# Patient Record
Sex: MALE | Race: BLACK OR AFRICAN AMERICAN | Employment: FULL TIME | URBAN - METROPOLITAN AREA
[De-identification: names, ages, dates, MRNs, and addresses within clinical notes are randomized per-mention and may not be internally consistent; named-entity substitution may affect disease eponyms.]

---

## 2021-12-18 ENCOUNTER — APPOINTMENT (OUTPATIENT)
Dept: CT IMAGING | Age: 56
End: 2021-12-18
Payer: COMMERCIAL

## 2021-12-18 ENCOUNTER — HOSPITAL ENCOUNTER (EMERGENCY)
Age: 56
Discharge: HOME | End: 2021-12-18
Payer: COMMERCIAL

## 2021-12-18 VITALS
DIASTOLIC BLOOD PRESSURE: 91 MMHG | OXYGEN SATURATION: 98 % | HEIGHT: 69 IN | WEIGHT: 165 LBS | TEMPERATURE: 97.9 F | BODY MASS INDEX: 24.44 KG/M2 | RESPIRATION RATE: 16 BRPM | SYSTOLIC BLOOD PRESSURE: 143 MMHG | HEART RATE: 77 BPM

## 2021-12-18 DIAGNOSIS — R20.2 PARESTHESIA: ICD-10-CM

## 2021-12-18 DIAGNOSIS — G62.9 NEUROPATHY: Primary | ICD-10-CM

## 2021-12-18 DIAGNOSIS — M50.90 CERVICAL DISC DISEASE: ICD-10-CM

## 2021-12-18 PROCEDURE — 99282 EMERGENCY DEPT VISIT SF MDM: CPT

## 2021-12-18 PROCEDURE — 70450 CT HEAD/BRAIN W/O DYE: CPT

## 2021-12-18 PROCEDURE — 72125 CT NECK SPINE W/O DYE: CPT

## 2021-12-18 RX ORDER — METHYLPREDNISOLONE 4 MG/1
2 TABLET ORAL DAILY
Qty: 3 TABLET | Refills: 0 | Status: SHIPPED | OUTPATIENT
Start: 2021-12-18 | End: 2021-12-24

## 2021-12-18 RX ORDER — AMLODIPINE BESYLATE 5 MG/1
5 TABLET ORAL DAILY
COMMUNITY

## 2021-12-18 ASSESSMENT — ENCOUNTER SYMPTOMS
EYE REDNESS: 0
PHOTOPHOBIA: 0
COUGH: 0
DIARRHEA: 0
EYE DISCHARGE: 0
STRIDOR: 0
CONSTIPATION: 0
EYE ITCHING: 0
SHORTNESS OF BREATH: 0
WHEEZING: 0
NAUSEA: 0
BACK PAIN: 0
COLOR CHANGE: 0
ABDOMINAL PAIN: 0
EYE PAIN: 0
VOMITING: 0

## 2021-12-18 ASSESSMENT — PAIN SCALES - GENERAL: PAINLEVEL_OUTOF10: 6

## 2021-12-18 NOTE — ED PROVIDER NOTES
[address] [facility]        Pt Name: Antwon Mills  MRN: [id number]  [place] 1965  Date of evaluation: 12/18/2021  Provider: Jorge Ragland PA-C  PCP: No primary care provider on file. Note Started: 10:29 AM EST       MERT. I have evaluated this patient. My supervising physician was available for consultation. CHIEF COMPLAINT       Chief Complaint   Patient presents with    Arm Pain     c/o left arm numbness/pain/tingling \"for a few months\", c/o burning in left hand. Denies injury    Numbness       HISTORY OF PRESENT ILLNESS   (Location, Timing/Onset, Context/Setting, Quality, Duration, Modifying Factors, Severity, Associated Signs and Symptoms)  Note limiting factors. Chief Complaint: Left arm tingling and pain    Antwon Mills is a 55 y.o. male who presents to the emergency department complaining of left arm tingling and pain for over 1 year. For the past few months, the pain and tingling has been more constant and now reports a burning sensation in 4 of his fingers of the left hand. Denies any acute weakness, neck pain, headache, dizziness, headedness, chest pain, shortness of breath or abdominal pain. This is the first time he has addressed this with a medical provider. He also reports that for the past month, he has similar presentation in the right arm but not as severe as the left arm. He assembles and staples boxes at work. He is right-hand dominant but is constantly flipping boxes over with his left hand and arm. Nursing Notes were all reviewed and agreed with or any disagreements were addressed in the HPI. REVIEW OF SYSTEMS    (2-9 systems for level 4, 10 or more for level 5)     Review of Systems   Constitutional: Negative for chills and fever. HENT: Negative. Eyes: Negative for photophobia, pain, discharge, redness, itching and visual disturbance.    Respiratory: Negative for cough, shortness of breath, wheezing and stridor. Cardiovascular: Negative for chest pain, palpitations and leg swelling. Gastrointestinal: Negative for abdominal pain, constipation, diarrhea, nausea and vomiting. Genitourinary: Negative. Musculoskeletal: Positive for myalgias. Negative for arthralgias, back pain, gait problem, joint swelling, neck pain and neck stiffness. Skin: Negative for color change, pallor, rash and wound. Neurological: Positive for numbness (tingling). Negative for dizziness, tremors, seizures, syncope, facial asymmetry, speech difficulty, weakness, light-headedness and headaches. Psychiatric/Behavioral: Negative for confusion. All other systems reviewed and are negative. Positives and Pertinent negatives as per HPI. Except as noted above in the ROS, all other systems were reviewed and negative. PAST MEDICAL HISTORY     Past Medical History:   Diagnosis Date    Hypertension          SURGICAL HISTORY   History reviewed. No pertinent surgical history. [id number]       Discharge Medication List as of 12/18/2021 11:31 AM      CONTINUE these medications which have NOT CHANGED    Details   amLODIPine (NORVASC) 5 MG tablet Take 5 mg by mouth dailyHistorical Med               ALLERGIES     Patient has no known allergies. FAMILYHISTORY     History reviewed. No pertinent family history. SOCIAL HISTORY       Social History     Tobacco Use    Smoking status: Current Every Day Smoker     Packs/day: 0.50     Types: Cigarettes    Smokeless tobacco: Never Used   Substance Use Topics    Alcohol use: Not Currently    Drug use: Never       SCREENINGS   NIH Stroke Scale  NIH Stroke Scale Assessed: Yes  Interval: Baseline  Level of Consciousness (1a. ): Alert  LOC Questions (1b. ):  Answers both correctly  LOC Commands (1c. ): Performs both tasks correctly  Best Gaze (2. ): Normal  Visual (3. ): No visual loss  Facial Palsy (4. ): Normal symmetrical movement  Motor Arm, Left (5a. ): No drift  Motor Arm, Right (5b. ): No drift  Motor Leg, Left (6a. ): No drift  Motor Leg, Right (6b. ): No drift  Limb Ataxia (7. ): Absent  Sensory (8. ): Normal  Best Language (9. ): No aphasia  Dysarthria (10. ): Normal  Extinction and Inattention (11): No abnormality  Total: 0         PHYSICAL EXAM    (up to 7 for level 4, 8 or more for level 5)     ED Triage Vitals [12/18/21 0946]   BP Temp Temp Source Pulse Resp SpO2 Height Weight   (!) 143/91 97.9 °F (36.6 °C) Oral 77 16 98 % 5' 9\" (1.753 m) 165 lb (74.8 kg)       Physical Exam  Vitals and nursing note reviewed. Constitutional:       Appearance: Normal appearance. He is well-developed. He is not toxic-appearing or diaphoretic. HENT:      Head: Normocephalic and atraumatic. Right Ear: External ear normal.      Left Ear: External ear normal.      Nose: Nose normal.      Mouth/Throat:      Mouth: Mucous membranes are moist.      Pharynx: Oropharynx is clear. Eyes:      General: No scleral icterus. Right eye: No discharge. Left eye: No discharge. Extraocular Movements: Extraocular movements intact. Conjunctiva/sclera: Conjunctivae normal.      Pupils: Pupils are equal, round, and reactive to light. Cardiovascular:      Rate and Rhythm: Normal rate. Pulses:           Carotid pulses are 2+ on the right side and 2+ on the left side. Radial pulses are 2+ on the right side and 2+ on the left side. Pulmonary:      Effort: Pulmonary effort is normal.      Breath sounds: Normal breath sounds. Abdominal:      General: Bowel sounds are normal.      Palpations: Abdomen is soft. Tenderness: There is no abdominal tenderness. Musculoskeletal:         General: Normal range of motion.       Right shoulder: Normal.      Left shoulder: Normal.      Right elbow: Normal.      Left elbow: Normal.      Right wrist: Normal.      Left wrist: Normal.      Right hand: Normal.      Left hand: Normal.      Cervical back: Normal and normal range of motion. Thoracic back: Normal.      Lumbar back: Normal.      Comments: No midline vertebral tenderness or step-off deformity. No extremity edema, poikilothermia, pallor, pain out of proportion to physical findings, track marks, erythema, red streaking. Skin:     General: Skin is warm and dry. Capillary Refill: Capillary refill takes less than 2 seconds. Coloration: Skin is not jaundiced or pale. Findings: No bruising, erythema, lesion or rash. Neurological:      Mental Status: He is alert and oriented to person, place, and time. Cranial Nerves: No cranial nerve deficit (II-XII Intact). Sensory: No sensory deficit. Motor: No weakness. Deep Tendon Reflexes: Reflexes normal.      Comments: Subjective tingling in left forearm and hand with burning sensation in the tips of the second through fifth digit of the left hand. Subjective tingling in the right forearm and hand (not as prominent as the left upper extremity)    No pronator drift, facial droop or slurred speech. Normal finger to nose coordination. Normal rapid alternating hand movement. Normal heel to shin coordination  Melvin Hallpike negative without nystagmus. 5 out of 5 strength in all 4 extremities without focal weakness     Psychiatric:         Mood and Affect: Mood normal.         Behavior: Behavior normal.         DIAGNOSTIC RESULTS   LABS:    Labs Reviewed - No data to display    When ordered only abnormal lab results are displayed. All other labs were within normal range or not returned as of this dictation. EKG: When ordered, EKG's are interpreted by the Emergency Department Physician in the absence of a cardiologist.  Please see their note for interpretation of EKG.     RADIOLOGY:   Non-plain film images such as CT, Ultrasound and MRI are read by the radiologist. Plain radiographic images are visualized and preliminarily interpreted by the ED Provider with the below findings:        Interpretation per the Radiologist below, if available at the time of this note:    CT HEAD WO CONTRAST   Final Result   No acute intracranial abnormality. Mild degree of low-attenuation in the cerebral white matter, nonspecific,   most likely small vessel disease. If multiple sclerosis/demyelinating   disease is a clinical differential possibility, outpatient follow-up MRI is   recommended. RECOMMENDATIONS:   Unavailable         CT CERVICAL SPINE WO CONTRAST   Final Result   No acute abnormality of the cervical spine. Straightened cervical lordosis with mild multilevel degenerative disc disease   and facet arthropathy. No results found. PROCEDURES   Unless otherwise noted below, none     Procedures    CRITICAL CARE TIME   N/A    CONSULTS:  None      EMERGENCY DEPARTMENT COURSE and DIFFERENTIAL DIAGNOSIS/MDM:   Vitals:    Vitals:    12/18/21 0946   BP: (!) 143/91   Pulse: 77   Resp: 16   Temp: 97.9 °F (36.6 °C)   TempSrc: Oral   SpO2: 98%   Weight: 165 lb (74.8 kg)   Height: 5' 9\" (1.753 m)       Patient was given the following medications:  Medications - No data to display        This patient presents to the emergency department complaining of a painful tingling sensation in left upper extremity for over 1 year. It was initially intermittent but now constant for the past few months and is now starting to involve the right upper extremity. Because of the bilateral nature of his presentation, suspicion is low for stroke. Carotid and radial pulses are intact. No overt evidence of infection. Motor and sensory function are preserved. Differentials include but not limited to strain, sprain, arthritis, bursitis, tinnitus, contusion, spasm, DDD, DJD, radiculopathy, neuropathy. CT scan shows cervical DDD. CT head shows likely small vessel disease without acute pathology. We cant completely exclude demyelinating disease, such as MS.  My suspicion is low for  ALS, Papo Barré syndrome, botulism, sepsis, DKA, pneumonia, pneumothorax,ACS, PE, myocarditis, pericarditis, endocarditis, acute pulmonary edema, pleural effusion, pericardial effusion, cardiac tamponade, cardiomyopathy, CHF exacerbation, thoracic aortic dissection, esophageal rupture, other life-threatening arrhythmia, hypertensive urgency or emergency, hemothorax, pulmonary contusion, subcutaneous emphysema, flail chest, pneumo mediastinum, rib fracture , subungual hematoma, paronychia, eponychia, felon, flexor tenosynovitis, thoracic outlet obstruction, SVC syndrome, foreign body, tendon rupture, compartment syndrome, acute fracture, dislocation, DVT, arterial compromise or occlusion, limb ischemia, gout, septic joint, abscess, cellulitis, osteomyelitis, acute spine fracture or dislocation, epidural abscess or hematoma, discitis, meningitis, encephalitis, transverse myelitis, pyelonephritis, perinephric abscess, urosepsis, kidney stone, AAA, dissection, shingles,carotid dissection, sinus abscess, acute fracture, acute CVA, ICH, SAH, TIA, meningitis, encephalitis, pseudotumor cerebri, temporal arteritis, sentinel bleed from ruptured aneurysm,  subdural hematoma, epidural hematoma or other concerning pathology. Sent home with medrol dosepak and given PCP and neurology referral. He may require an outpatient EMG or MRI to inquire further of his chronic symptoms. Vitals stable here and patient is stable for discharge. We have addressed concerns and expectations. FINAL IMPRESSION      1. Neuropathy    2. Paresthesia    3.  Cervical disc disease          DISPOSITION/PLAN   DISPOSITION Decision To Discharge 12/18/2021 11:21:51 AM      PATIENT REFERRED TO:  Shabnam Lucia MD  [address] [place] [address]  [phone]      for neurology follow up    [facility] Emergency Department  [address] [place] [address]  [phone]    If symptoms worsen      DISCHARGE MEDICATIONS:  Discharge Medication List as of 12/18/2021 11:31 AM      START taking these medications    Details   methylPREDNISolone (MEDROL) 4 MG tablet Take 0.5 tablets by mouth daily for 6 days, Disp-3 tablet, R-0Print             DISCONTINUED MEDICATIONS:  Discharge Medication List as of 12/18/2021 11:31 AM                 (Please note that portions of this note were completed with a voice recognition program.  Efforts were made to edit the dictations but occasionally words are mis-transcribed.)    Kurt Sloan PA-C (electronically signed)            Kurt Sloan PA-C  12/18/21 1148